# Patient Record
Sex: MALE | URBAN - METROPOLITAN AREA
[De-identification: names, ages, dates, MRNs, and addresses within clinical notes are randomized per-mention and may not be internally consistent; named-entity substitution may affect disease eponyms.]

---

## 2021-01-14 ENCOUNTER — TELEPHONE (OUTPATIENT)
Dept: CARDIOLOGY CLINIC | Age: 71
End: 2021-01-14

## 2021-01-14 NOTE — TELEPHONE ENCOUNTER
Jordan from [address] called. Patient had critical BNP of today. She is faxing lab over to Dr. Marjorie Jarrett. She can be reached at [phone].

## 2021-01-15 ENCOUNTER — OFFICE VISIT (OUTPATIENT)
Dept: CARDIOLOGY CLINIC | Age: 71
End: 2021-01-15

## 2021-01-15 VITALS
BODY MASS INDEX: 34.58 KG/M2 | SYSTOLIC BLOOD PRESSURE: 84 MMHG | HEART RATE: 81 BPM | HEIGHT: 71 IN | WEIGHT: 247 LBS | DIASTOLIC BLOOD PRESSURE: 60 MMHG | TEMPERATURE: 97 F

## 2021-01-15 DIAGNOSIS — R94.31 ABNORMAL EKG: Primary | ICD-10-CM

## 2021-01-15 PROCEDURE — 93000 ELECTROCARDIOGRAM COMPLETE: CPT | Performed by: INTERNAL MEDICINE

## 2021-01-15 PROCEDURE — 99245 OFF/OP CONSLTJ NEW/EST HI 55: CPT | Performed by: INTERNAL MEDICINE

## 2021-01-15 RX ORDER — SENNA AND DOCUSATE SODIUM 50; 8.6 MG/1; MG/1
1 TABLET, FILM COATED ORAL DAILY
COMMUNITY

## 2021-01-15 RX ORDER — FLUOXETINE 10 MG/1
10 CAPSULE ORAL DAILY
COMMUNITY

## 2021-01-15 RX ORDER — BUDESONIDE AND FORMOTEROL FUMARATE DIHYDRATE 160; 4.5 UG/1; UG/1
2 AEROSOL RESPIRATORY (INHALATION) 2 TIMES DAILY
COMMUNITY

## 2021-01-15 RX ORDER — BUPROPION HYDROCHLORIDE 100 MG/1
100 TABLET ORAL DAILY
COMMUNITY

## 2021-01-15 RX ORDER — MOXIFLOXACIN 5 MG/ML
1 SOLUTION/ DROPS OPHTHALMIC 3 TIMES DAILY
COMMUNITY

## 2021-01-15 RX ORDER — CARVEDILOL 12.5 MG/1
12.5 TABLET ORAL 2 TIMES DAILY WITH MEALS
COMMUNITY

## 2021-01-15 RX ORDER — ATROPINE SULFATE 10 MG/ML
1 SOLUTION/ DROPS OPHTHALMIC 3 TIMES DAILY
COMMUNITY

## 2021-01-15 RX ORDER — FERROUS SULFATE 325(65) MG
325 TABLET ORAL
COMMUNITY

## 2021-01-15 RX ORDER — ALLOPURINOL 100 MG/1
100 TABLET ORAL DAILY
COMMUNITY

## 2021-01-15 RX ORDER — ATORVASTATIN CALCIUM 40 MG/1
40 TABLET, FILM COATED ORAL DAILY
COMMUNITY

## 2021-01-15 RX ORDER — TRAMADOL HYDROCHLORIDE 50 MG/1
50 TABLET ORAL EVERY 6 HOURS PRN
COMMUNITY

## 2021-01-15 RX ORDER — PREDNISOLONE ACETATE 10 MG/ML
1 SUSPENSION/ DROPS OPHTHALMIC 4 TIMES DAILY
COMMUNITY

## 2021-01-15 RX ORDER — ACETAMINOPHEN 325 MG/1
650 TABLET ORAL EVERY 4 HOURS PRN
COMMUNITY

## 2021-01-15 RX ORDER — FUROSEMIDE 40 MG/1
40 TABLET ORAL DAILY
COMMUNITY

## 2021-01-15 NOTE — PROGRESS NOTES
[facility] [place] [id number]     Outpatient Cardiology Consult  Consulting Cardiologist Migdalia Sarmiento M.D., [facility]  Referring Provider:  No primary care provider on file. 1/15/2021,11:49 AM    Chief Complaint   Patient presents with    New Patient    Abnormal Test Results           Asked by No admitting provider for patient encounter./No primary care provider on file. to evaluate and assess this patient's coronary artery disease and abnormal EKG    History of Present Illness: Shannon Mcclendon is a 69 y.o. male is here for cardiac evaluation in reference to apparently a recent EKG performed at [facility]. This patient comes to me and somewhat of a convoluted manner. He states that he receives all of his care at the [facility] here in [place] and that he is from a long line [facility],, grandfather, father, himself, son, grandson he lives in [facility] apparently had an episode of syncope and was admitted at [facility]. Did undergo a cardiac cath back in December 2020 at which time he was found to have significant three-vessel disease felt to be nonoperable by surgery and potentially a partial fix by percutaneous but high risk. He did have a neurological episode and did wind up at [facility] for recuperation and subsequently is at [facility] currently. He has anticipation of moving back to his home in [facility] or [facility] when he is released. He denies any chest pains or shortness of breath or dyspnea. He is still fairly much wheelchair bound but is able to stand for short periods of time and walk to the bathroom of less than 10 feet. No PND or orthopnea and his rhythm apparently has been stable. Examination today looks fairly stable. He is 70 and blood pressure is 104/60 on current therapy. Lungs are clear the extremities show no edema and for the most part seems fairly intact neurologically.         Cardiac cath on 12/1/2020 at [facility] with three-vessel disease felt to be nonoperable. Possibly treatable to some extent by percutaneous intervention  Status post AAA repair  Chronic renal failure see creatinine is 2.88  Chronic back pain  Hyperlipidemia  Left eye issue and anticipating surgery  Past Medical History:   has no past medical history on file. Surgical History:   has no past surgical history on file. Social History:   reports that he has quit smoking. His smoking use included cigarettes. He smoked 0.50 packs per day. He does not have any smokeless tobacco history on file. Family History:  family history is not on file. Home Medications:  Prior to Admission medications    Medication Sig Start Date End Date Taking? Authorizing Provider   traMADol (ULTRAM) 50 MG tablet Take 50 mg by mouth every 6 hours as needed for Pain.    Yes Historical Provider, MD   carvedilol (COREG) 12.5 MG tablet Take 12.5 mg by mouth 2 times daily (with meals)   Yes Historical Provider, MD   furosemide (LASIX) 40 MG tablet Take 40 mg by mouth daily   Yes Historical Provider, MD   ferrous sulfate (IRON 325) 325 (65 Fe) MG tablet Take 325 mg by mouth daily (with breakfast)   Yes Historical Provider, MD   FLUoxetine (PROZAC) 10 MG capsule Take 10 mg by mouth daily   Yes Historical Provider, MD   tiotropium (SPIRIVA) 18 MCG inhalation capsule Inhale 18 mcg into the lungs daily   Yes Historical Provider, MD   buPROPion (WELLBUTRIN) 100 MG tablet Take 100 mg by mouth daily   Yes Historical Provider, MD   budesonide-formoterol (SYMBICORT) 160-4.5 MCG/ACT AERO Inhale 2 puffs into the lungs 2 times daily   Yes Historical Provider, MD   atorvastatin (LIPITOR) 40 MG tablet Take 40 mg by mouth daily   Yes Historical Provider, MD   atropine 1 % ophthalmic solution 1 drop 3 times daily   Yes Historical Provider, MD   allopurinol (ZYLOPRIM) 100 MG tablet Take 100 mg by mouth daily   Yes Historical Provider, MD   prednisoLONE acetate (PRED FORTE) 1 % ophthalmic suspension 1 drop 4 times daily   Yes Historical Provider, MD   sennosides-docusate sodium (SENOKOT-S) 8.6-50 MG tablet Take 1 tablet by mouth daily   Yes Historical Provider, MD   moxifloxacin (VIGAMOX) 0.5 % ophthalmic solution 1 drop 3 times daily   Yes Historical Provider, MD   acetaminophen (TYLENOL) 325 MG tablet Take 650 mg by mouth every 4 hours as needed for Pain   Yes Historical Provider, MD           Allergies:  Lisinopril     Review of Systems:   · Constitutional: there has been no unanticipated weight loss. · Eyes: No visual changes or diplopia. No scleral icterus. · ENT: No Headaches, hearing loss or vertigo. No mouth sores or sore throat. · Cardiovascular: Reviewed in HPI  ·  Pulmonary:  no cough or sputum production. · Gastrointestinal: No abdominal pain, appetite loss, blood in stools. No change in bowel or bladder habits. · Genitourinary: No dysuria, trouble voiding, or hematuria. · Musculoskeletal:  No gait disturbance, weakness or joint complaints. · Integumentary: No rash or pruritis. Endocrine: No malaise, fatigue or temperature intolerance. Hematologic/Lymphatic: No abnormal bruising or bleeding, blood clots or swollen lymph nodes. · Allergic/Immunologic: No nasal congestion or hives. · All other ROS are reviewed and are unremarkable. Physical Examination:      Wt Readings from Last 3 Encounters:   01/15/21 247 lb (112 kg)       BP Readings from Last 3 Encounters:   01/15/21 84/60       Pulse Readings from Last 3 Encounters:   01/15/21 81       Constitutional and General Appearance:  Healthy. And alert . HEENT: eyes and ears intact.  No nasal masses  THYROID: not enlarged  LUNGS:  · Clear to auscultation and percussion  HEART and VASCULAR:  · The apical impulses not displaced  · Heart tones are crisp and normal  · Cervical veins are not engorged  · The carotid upstroke is normal in amplitude and contour without delay or bruit  · Peripheral pulses are symmetrical and full  · There is no clubbing, cyanosis of the extremities. · No peripheral edema  · Femoral Arteries: 2+ and equal  · Pedal Pulses: 2+ and equal   ABDOMEN[de-identified]  · No masses or tenderness  · Liver/Spleen: No Abnormalities Noted  NEUROLOGICAL:  . Moves all extremities to command. Cranial nerves 2-12 are in tact. PSYCHIATRIC: alert and lucid  and oriented and appropriate  SKIN: No lesions or rashes  LYMPH NODES: none enlarged    ·   ·   ·     CBC with Differential:  No results found for: WBC, RBC, HGB, HCT, PLT, MCV, MCH, MCHC, RDW, NRBC, SEGSPCT, BANDSPCT, BLASTSPCT, METASPCT, LYMPHOPCT, PROMYELOPCT, MONOPCT, MYELOPCT, EOSPCT, BASOPCT, MONOSABS, LYMPHSABS, EOSABS, BASOSABS, DIFFTYPE  BMP:  No results found for: NA, K, CL, CO2, BUN, LABALBU, CREATININE, CALCIUM, GFRAA, LABGLOM  Uric Acid:  No components found for: URIC  PT/INR:  No results found for: PROTIME, INR  Last 3 Troponin:  No results found for: TROPONINI  FLP:  No results found for: TRIG, HDL, LDLCALC, LDLDIRECT, LABVLDL       Mid LAD lesion is 80% stenosed. Cath [facility] 12/1/20  · Mid Cx lesion is 60% stenosed. · Mid RCA lesion is 90% stenosed. · Dist RCA lesion is 80% stenosed. Severe diffuse three-vessel native coronary artery disease    Diffusely aneurysmal ectatic coronary arteries left system not amiable to   percutaneous treatment options    High-grade focal disease involving mid RCA that perhaps could be   approached percutaneously    Normal LV filling pressures    Plan    Continue to optimize medical management  Evaluate for more definitive management of mitral regurgitation  And consider PCI to RCA in future    FINAL IMPRESSIONS 10/28/20  1. Duplex scan reveals no evidence of significant occlusive disease   involving the aortic graft. 2. There is no significant increase in the diameter of the residual   aneurysm sac, relative to the previous exam performed on 6/1/2019.    3. Lawbarrience Reichmann is no evidence of endoleak involving the aortic graft. 4. The right external iliac artery appears aneurysmal measuring   approximately 2.3 cm by 2.7 cm.    5. 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  6. There appears to be no progression of disease since the previous exam   of 2019. Full report view including tables and additional findings is available in   the link below. EK/15/2021 atrial paced rhythm at 96/min echocardiogram  This patient was educated using the patient point room wall mount device. Absence from smokers and smoking and diet and exercising are important. Assessment/ Plan   CAD and by data appears to have severe three-vessel disease that is not surgical.  Possibly partial fixed with a high risk PCI. He is clinically stable and not having any chest pains or shortness of breath and his blood pressure is well controlled on current therapy. I note that he is on labetalol atorvastatin 40 mg Lasix and Plavix. At this point it seems that all looks fairly stable for him. I see no reason to change anything. I will be happy to follow him if he remains in our region. He apparently is interested in going back to his home in [facility]. Receiving his care continuously at the [facility]. Thanks for allowing us the opportunity  to participate in the evaluation and care of your patients.  Please call if we may assist further [phone]    This note was likely completed using voice recognition technology and may contain unintended errors  Mart Rosa M.D., [facility]  /[id number] AM